# Patient Record
Sex: MALE | Race: ASIAN | NOT HISPANIC OR LATINO | URBAN - METROPOLITAN AREA
[De-identification: names, ages, dates, MRNs, and addresses within clinical notes are randomized per-mention and may not be internally consistent; named-entity substitution may affect disease eponyms.]

---

## 2017-01-16 ENCOUNTER — COMMUNICATION - HEALTHEAST (OUTPATIENT)
Dept: INTERNAL MEDICINE | Facility: CLINIC | Age: 82
End: 2017-01-16

## 2017-01-16 DIAGNOSIS — E11.9 DIABETES MELLITUS (H): ICD-10-CM

## 2017-01-16 DIAGNOSIS — E78.5 HYPERLIPIDEMIA: ICD-10-CM

## 2017-01-16 DIAGNOSIS — I10 HTN (HYPERTENSION): ICD-10-CM

## 2017-02-07 ENCOUNTER — COMMUNICATION - HEALTHEAST (OUTPATIENT)
Dept: INTERNAL MEDICINE | Facility: CLINIC | Age: 82
End: 2017-02-07

## 2017-02-08 ENCOUNTER — OFFICE VISIT - HEALTHEAST (OUTPATIENT)
Dept: INTERNAL MEDICINE | Facility: CLINIC | Age: 82
End: 2017-02-08

## 2017-02-08 DIAGNOSIS — I10 ESSENTIAL HYPERTENSION: ICD-10-CM

## 2017-02-08 DIAGNOSIS — E11.9 TYPE 2 DIABETES MELLITUS (H): ICD-10-CM

## 2017-02-23 ENCOUNTER — COMMUNICATION - HEALTHEAST (OUTPATIENT)
Dept: INTERNAL MEDICINE | Facility: CLINIC | Age: 82
End: 2017-02-23

## 2017-03-07 ENCOUNTER — RECORDS - HEALTHEAST (OUTPATIENT)
Dept: ADMINISTRATIVE | Facility: OTHER | Age: 82
End: 2017-03-07

## 2017-03-07 ENCOUNTER — COMMUNICATION - HEALTHEAST (OUTPATIENT)
Dept: INTERNAL MEDICINE | Facility: CLINIC | Age: 82
End: 2017-03-07

## 2017-03-07 ENCOUNTER — OFFICE VISIT - HEALTHEAST (OUTPATIENT)
Dept: ALLERGY | Facility: CLINIC | Age: 82
End: 2017-03-07

## 2017-03-07 ENCOUNTER — AMBULATORY - HEALTHEAST (OUTPATIENT)
Dept: INTERNAL MEDICINE | Facility: CLINIC | Age: 82
End: 2017-03-07

## 2017-03-07 DIAGNOSIS — J45.30 MILD PERSISTENT ASTHMA WITHOUT COMPLICATION: ICD-10-CM

## 2017-03-07 DIAGNOSIS — J30.89 ALLERGIC RHINITIS DUE TO OTHER ALLERGEN: ICD-10-CM

## 2017-03-07 DIAGNOSIS — E11.9 TYPE 2 DIABETES MELLITUS (H): ICD-10-CM

## 2017-03-07 RX ORDER — FAMOTIDINE 10 MG
10 TABLET ORAL PRN
Status: SHIPPED | COMMUNITY
Start: 2017-03-07

## 2017-03-07 ASSESSMENT — MIFFLIN-ST. JEOR: SCORE: 1265.52

## 2017-03-09 ENCOUNTER — COMMUNICATION - HEALTHEAST (OUTPATIENT)
Dept: INTERNAL MEDICINE | Facility: CLINIC | Age: 82
End: 2017-03-09

## 2017-03-09 ENCOUNTER — AMBULATORY - HEALTHEAST (OUTPATIENT)
Dept: INTERNAL MEDICINE | Facility: CLINIC | Age: 82
End: 2017-03-09

## 2017-03-09 DIAGNOSIS — E11.9 TYPE 2 DIABETES MELLITUS (H): ICD-10-CM

## 2017-03-09 DIAGNOSIS — E11.9 DM2 (DIABETES MELLITUS, TYPE 2) (H): ICD-10-CM

## 2017-03-09 DIAGNOSIS — E11.9 DIABETES MELLITUS (H): ICD-10-CM

## 2017-03-09 RX ORDER — BLOOD-GLUCOSE CONTROL, NORMAL
EACH MISCELLANEOUS
Qty: 1 EACH | Refills: 0 | Status: SHIPPED | OUTPATIENT
Start: 2017-03-09

## 2017-03-13 ENCOUNTER — COMMUNICATION - HEALTHEAST (OUTPATIENT)
Dept: ALLERGY | Facility: CLINIC | Age: 82
End: 2017-03-13

## 2017-03-14 ENCOUNTER — AMBULATORY - HEALTHEAST (OUTPATIENT)
Dept: ALLERGY | Facility: CLINIC | Age: 82
End: 2017-03-14

## 2017-03-14 DIAGNOSIS — J45.30 MILD PERSISTENT ASTHMA WITHOUT COMPLICATION: ICD-10-CM

## 2017-03-27 ENCOUNTER — COMMUNICATION - HEALTHEAST (OUTPATIENT)
Dept: INTERNAL MEDICINE | Facility: CLINIC | Age: 82
End: 2017-03-27

## 2017-03-27 DIAGNOSIS — N20.0 KIDNEY STONE: ICD-10-CM

## 2017-03-28 ENCOUNTER — COMMUNICATION - HEALTHEAST (OUTPATIENT)
Dept: UROLOGY | Facility: CLINIC | Age: 82
End: 2017-03-28

## 2017-04-12 ENCOUNTER — COMMUNICATION - HEALTHEAST (OUTPATIENT)
Dept: ALLERGY | Facility: CLINIC | Age: 82
End: 2017-04-12

## 2017-04-17 ENCOUNTER — OFFICE VISIT - HEALTHEAST (OUTPATIENT)
Dept: ALLERGY | Facility: CLINIC | Age: 82
End: 2017-04-17

## 2017-04-17 DIAGNOSIS — J30.89 ALLERGIC RHINITIS DUE TO OTHER ALLERGEN: ICD-10-CM

## 2017-04-17 DIAGNOSIS — J45.30 MILD PERSISTENT ASTHMA WITHOUT COMPLICATION: ICD-10-CM

## 2017-04-26 ENCOUNTER — RECORDS - HEALTHEAST (OUTPATIENT)
Dept: ADMINISTRATIVE | Facility: OTHER | Age: 82
End: 2017-04-26

## 2017-05-24 ENCOUNTER — RECORDS - HEALTHEAST (OUTPATIENT)
Dept: ADMINISTRATIVE | Facility: OTHER | Age: 82
End: 2017-05-24

## 2017-07-02 ENCOUNTER — RECORDS - HEALTHEAST (OUTPATIENT)
Dept: ADMINISTRATIVE | Facility: OTHER | Age: 82
End: 2017-07-02

## 2017-07-05 ENCOUNTER — RECORDS - HEALTHEAST (OUTPATIENT)
Dept: ADMINISTRATIVE | Facility: OTHER | Age: 82
End: 2017-07-05

## 2017-07-14 ENCOUNTER — COMMUNICATION - HEALTHEAST (OUTPATIENT)
Dept: ALLERGY | Facility: CLINIC | Age: 82
End: 2017-07-14

## 2017-07-14 DIAGNOSIS — J45.30 MILD PERSISTENT ASTHMA WITHOUT COMPLICATION: ICD-10-CM

## 2017-08-22 ENCOUNTER — COMMUNICATION - HEALTHEAST (OUTPATIENT)
Dept: ALLERGY | Facility: CLINIC | Age: 82
End: 2017-08-22

## 2017-08-22 DIAGNOSIS — J45.30 MILD PERSISTENT ASTHMA WITHOUT COMPLICATION: ICD-10-CM

## 2017-08-24 ENCOUNTER — OFFICE VISIT - HEALTHEAST (OUTPATIENT)
Dept: INTERNAL MEDICINE | Facility: CLINIC | Age: 82
End: 2017-08-24

## 2017-08-24 DIAGNOSIS — I10 ESSENTIAL HYPERTENSION: ICD-10-CM

## 2017-08-24 DIAGNOSIS — E11.9 TYPE 2 DIABETES MELLITUS (H): ICD-10-CM

## 2017-08-24 DIAGNOSIS — E78.2 MIXED HYPERLIPIDEMIA: ICD-10-CM

## 2017-08-24 LAB
CHOLEST SERPL-MCNC: 139 MG/DL
FASTING STATUS PATIENT QL REPORTED: YES
HBA1C MFR BLD: 6 % (ref 3.5–6)
HDLC SERPL-MCNC: 48 MG/DL
LDLC SERPL CALC-MCNC: 73 MG/DL
TRIGL SERPL-MCNC: 89 MG/DL

## 2017-08-31 ENCOUNTER — RECORDS - HEALTHEAST (OUTPATIENT)
Dept: ADMINISTRATIVE | Facility: OTHER | Age: 82
End: 2017-08-31

## 2017-09-20 ENCOUNTER — RECORDS - HEALTHEAST (OUTPATIENT)
Dept: ADMINISTRATIVE | Facility: OTHER | Age: 82
End: 2017-09-20

## 2017-10-06 ENCOUNTER — RECORDS - HEALTHEAST (OUTPATIENT)
Dept: ADMINISTRATIVE | Facility: OTHER | Age: 82
End: 2017-10-06

## 2017-11-01 ENCOUNTER — COMMUNICATION - HEALTHEAST (OUTPATIENT)
Dept: INTERNAL MEDICINE | Facility: CLINIC | Age: 82
End: 2017-11-01

## 2017-11-02 ENCOUNTER — COMMUNICATION - HEALTHEAST (OUTPATIENT)
Dept: INTERNAL MEDICINE | Facility: CLINIC | Age: 82
End: 2017-11-02

## 2017-11-02 DIAGNOSIS — E11.9 TYPE 2 DIABETES MELLITUS (H): ICD-10-CM

## 2018-02-22 ENCOUNTER — COMMUNICATION - HEALTHEAST (OUTPATIENT)
Dept: INTERNAL MEDICINE | Facility: CLINIC | Age: 83
End: 2018-02-22

## 2018-02-22 DIAGNOSIS — E11.9 DIABETES MELLITUS (H): ICD-10-CM

## 2018-02-22 DIAGNOSIS — E78.5 HYPERLIPIDEMIA: ICD-10-CM

## 2018-02-22 DIAGNOSIS — I10 HTN (HYPERTENSION): ICD-10-CM

## 2018-04-16 ENCOUNTER — COMMUNICATION - HEALTHEAST (OUTPATIENT)
Dept: INTERNAL MEDICINE | Facility: CLINIC | Age: 83
End: 2018-04-16

## 2018-04-24 ENCOUNTER — COMMUNICATION - HEALTHEAST (OUTPATIENT)
Dept: INTERNAL MEDICINE | Facility: CLINIC | Age: 83
End: 2018-04-24

## 2018-04-24 DIAGNOSIS — I10 ESSENTIAL HYPERTENSION: ICD-10-CM

## 2018-04-24 DIAGNOSIS — E78.2 MIXED HYPERLIPIDEMIA: ICD-10-CM

## 2018-04-24 DIAGNOSIS — E11.9 TYPE 2 DIABETES MELLITUS (H): ICD-10-CM

## 2018-04-25 ENCOUNTER — OFFICE VISIT - HEALTHEAST (OUTPATIENT)
Dept: INTERNAL MEDICINE | Facility: CLINIC | Age: 83
End: 2018-04-25

## 2018-04-25 DIAGNOSIS — I10 ESSENTIAL HYPERTENSION: ICD-10-CM

## 2018-04-25 DIAGNOSIS — D64.9 ANEMIA: ICD-10-CM

## 2018-04-25 DIAGNOSIS — E78.2 MIXED HYPERLIPIDEMIA: ICD-10-CM

## 2018-04-25 DIAGNOSIS — E11.9 TYPE 2 DIABETES MELLITUS (H): ICD-10-CM

## 2018-04-25 LAB
ALBUMIN SERPL-MCNC: 3.6 G/DL (ref 3.5–5)
ALBUMIN UR-MCNC: NEGATIVE MG/DL
ALP SERPL-CCNC: 56 U/L (ref 45–120)
ALT SERPL W P-5'-P-CCNC: 29 U/L (ref 0–45)
ANION GAP SERPL CALCULATED.3IONS-SCNC: 9 MMOL/L (ref 5–18)
APPEARANCE UR: CLEAR
AST SERPL W P-5'-P-CCNC: 31 U/L (ref 0–40)
BACTERIA #/AREA URNS HPF: ABNORMAL HPF
BASOPHILS # BLD AUTO: 0 THOU/UL (ref 0–0.2)
BASOPHILS NFR BLD AUTO: 1 % (ref 0–2)
BILIRUB SERPL-MCNC: 1.2 MG/DL (ref 0–1)
BILIRUB UR QL STRIP: NEGATIVE
BUN SERPL-MCNC: 13 MG/DL (ref 8–28)
CALCIUM SERPL-MCNC: 9.7 MG/DL (ref 8.5–10.5)
CHLORIDE BLD-SCNC: 105 MMOL/L (ref 98–107)
CHOLEST SERPL-MCNC: 142 MG/DL
CO2 SERPL-SCNC: 27 MMOL/L (ref 22–31)
COLOR UR AUTO: YELLOW
CREAT SERPL-MCNC: 0.92 MG/DL (ref 0.7–1.3)
CREAT UR-MCNC: 25.6 MG/DL
EOSINOPHIL # BLD AUTO: 0.3 THOU/UL (ref 0–0.4)
EOSINOPHIL NFR BLD AUTO: 5 % (ref 0–6)
ERYTHROCYTE [DISTWIDTH] IN BLOOD BY AUTOMATED COUNT: 13.2 % (ref 11–14.5)
ERYTHROCYTE [DISTWIDTH] IN BLOOD BY AUTOMATED COUNT: 13.3 % (ref 11–14.5)
FASTING STATUS PATIENT QL REPORTED: YES
GFR SERPL CREATININE-BSD FRML MDRD: >60 ML/MIN/1.73M2
GLUCOSE BLD-MCNC: 104 MG/DL (ref 70–125)
GLUCOSE UR STRIP-MCNC: NEGATIVE MG/DL
HBA1C MFR BLD: 6.3 % (ref 3.5–6)
HCT VFR BLD AUTO: 38.8 % (ref 40–54)
HCT VFR BLD AUTO: 38.9 % (ref 40–54)
HDLC SERPL-MCNC: 50 MG/DL
HGB BLD-MCNC: 13 G/DL (ref 14–18)
HGB BLD-MCNC: 13.1 G/DL (ref 14–18)
HGB UR QL STRIP: ABNORMAL
KETONES UR STRIP-MCNC: NEGATIVE MG/DL
LDLC SERPL CALC-MCNC: 72 MG/DL
LEUKOCYTE ESTERASE UR QL STRIP: NEGATIVE
LYMPHOCYTES # BLD AUTO: 1.6 THOU/UL (ref 0.8–4.4)
LYMPHOCYTES NFR BLD AUTO: 25 % (ref 20–40)
MCH RBC QN AUTO: 29.7 PG (ref 27–34)
MCH RBC QN AUTO: 29.8 PG (ref 27–34)
MCHC RBC AUTO-ENTMCNC: 33.6 G/DL (ref 32–36)
MCHC RBC AUTO-ENTMCNC: 33.7 G/DL (ref 32–36)
MCV RBC AUTO: 88 FL (ref 80–100)
MCV RBC AUTO: 89 FL (ref 80–100)
MICROALBUMIN UR-MCNC: <0.5 MG/DL (ref 0–1.99)
MICROALBUMIN/CREAT UR: NORMAL MG/G
MONOCYTES # BLD AUTO: 0.4 THOU/UL (ref 0–0.9)
MONOCYTES NFR BLD AUTO: 6 % (ref 2–10)
NEUTROPHILS # BLD AUTO: 4 THOU/UL (ref 2–7.7)
NEUTROPHILS NFR BLD AUTO: 64 % (ref 50–70)
NITRATE UR QL: NEGATIVE
PH UR STRIP: 7 [PH] (ref 5–8)
PLATELET # BLD AUTO: 189 THOU/UL (ref 140–440)
PLATELET # BLD AUTO: 200 THOU/UL (ref 140–440)
PMV BLD AUTO: 7.7 FL (ref 7–10)
PMV BLD AUTO: 8.3 FL (ref 7–10)
POTASSIUM BLD-SCNC: 4.4 MMOL/L (ref 3.5–5)
PROT SERPL-MCNC: 7.1 G/DL (ref 6–8)
RBC # BLD AUTO: 4.38 MILL/UL (ref 4.4–6.2)
RBC # BLD AUTO: 4.39 MILL/UL (ref 4.4–6.2)
RBC #/AREA URNS AUTO: ABNORMAL HPF
SODIUM SERPL-SCNC: 141 MMOL/L (ref 136–145)
SP GR UR STRIP: 1.01 (ref 1–1.03)
SQUAMOUS #/AREA URNS AUTO: ABNORMAL LPF
TRIGL SERPL-MCNC: 99 MG/DL
UROBILINOGEN UR STRIP-ACNC: ABNORMAL
WBC #/AREA URNS AUTO: ABNORMAL HPF
WBC: 6.2 THOU/UL (ref 4–11)
WBC: 6.3 THOU/UL (ref 4–11)

## 2018-04-25 RX ORDER — CETIRIZINE HYDROCHLORIDE 10 MG/1
10 TABLET ORAL DAILY
Status: SHIPPED | COMMUNITY
Start: 2018-04-25

## 2018-05-02 ENCOUNTER — COMMUNICATION - HEALTHEAST (OUTPATIENT)
Dept: INTERNAL MEDICINE | Facility: CLINIC | Age: 83
End: 2018-05-02

## 2018-05-02 DIAGNOSIS — J45.30 MILD PERSISTENT ASTHMA WITHOUT COMPLICATION: ICD-10-CM

## 2018-05-14 ENCOUNTER — COMMUNICATION - HEALTHEAST (OUTPATIENT)
Dept: ALLERGY | Facility: CLINIC | Age: 83
End: 2018-05-14

## 2018-05-14 DIAGNOSIS — J45.30 MILD PERSISTENT ASTHMA WITHOUT COMPLICATION: ICD-10-CM

## 2018-05-14 RX ORDER — DEXAMETHASONE 4 MG/1
TABLET ORAL
Qty: 1 INHALER | Refills: 0 | Status: SHIPPED | OUTPATIENT
Start: 2018-05-14

## 2018-05-16 ENCOUNTER — COMMUNICATION - HEALTHEAST (OUTPATIENT)
Dept: INTERNAL MEDICINE | Facility: CLINIC | Age: 83
End: 2018-05-16

## 2018-05-16 DIAGNOSIS — E11.9 DIABETES MELLITUS (H): ICD-10-CM

## 2018-08-01 ENCOUNTER — COMMUNICATION - HEALTHEAST (OUTPATIENT)
Dept: INTERNAL MEDICINE | Facility: CLINIC | Age: 83
End: 2018-08-01

## 2018-08-01 DIAGNOSIS — I10 HTN (HYPERTENSION): ICD-10-CM

## 2018-08-01 DIAGNOSIS — E78.5 HYPERLIPIDEMIA: ICD-10-CM

## 2018-08-01 RX ORDER — SIMVASTATIN 20 MG
TABLET ORAL
Qty: 90 TABLET | Refills: 1 | Status: SHIPPED | OUTPATIENT
Start: 2018-08-01

## 2018-08-01 RX ORDER — CARVEDILOL 25 MG/1
TABLET ORAL
Qty: 180 TABLET | Refills: 1 | Status: SHIPPED | OUTPATIENT
Start: 2018-08-01

## 2018-10-12 ENCOUNTER — RECORDS - HEALTHEAST (OUTPATIENT)
Dept: ADMINISTRATIVE | Facility: OTHER | Age: 83
End: 2018-10-12

## 2018-10-16 ENCOUNTER — OFFICE VISIT - HEALTHEAST (OUTPATIENT)
Dept: INTERNAL MEDICINE | Facility: CLINIC | Age: 83
End: 2018-10-16

## 2018-10-16 DIAGNOSIS — E78.2 MIXED HYPERLIPIDEMIA: ICD-10-CM

## 2018-10-16 DIAGNOSIS — E11.9 TYPE 2 DIABETES MELLITUS (H): ICD-10-CM

## 2018-10-16 DIAGNOSIS — D64.9 ANEMIA: ICD-10-CM

## 2018-10-16 DIAGNOSIS — I10 ESSENTIAL HYPERTENSION: ICD-10-CM

## 2018-10-16 LAB
ALBUMIN SERPL-MCNC: 3.6 G/DL (ref 3.5–5)
ALBUMIN UR-MCNC: NEGATIVE MG/DL
ALP SERPL-CCNC: 50 U/L (ref 45–120)
ALT SERPL W P-5'-P-CCNC: 14 U/L (ref 0–45)
ANION GAP SERPL CALCULATED.3IONS-SCNC: 8 MMOL/L (ref 5–18)
APPEARANCE UR: CLEAR
AST SERPL W P-5'-P-CCNC: 21 U/L (ref 0–40)
BACTERIA #/AREA URNS HPF: ABNORMAL HPF
BASOPHILS # BLD AUTO: 0 THOU/UL (ref 0–0.2)
BASOPHILS NFR BLD AUTO: 1 % (ref 0–2)
BILIRUB SERPL-MCNC: 0.9 MG/DL (ref 0–1)
BILIRUB UR QL STRIP: NEGATIVE
BUN SERPL-MCNC: 12 MG/DL (ref 8–28)
CALCIUM SERPL-MCNC: 9.5 MG/DL (ref 8.5–10.5)
CHLORIDE BLD-SCNC: 105 MMOL/L (ref 98–107)
CHOLEST SERPL-MCNC: 122 MG/DL
CO2 SERPL-SCNC: 27 MMOL/L (ref 22–31)
COLOR UR AUTO: YELLOW
CREAT SERPL-MCNC: 0.89 MG/DL (ref 0.7–1.3)
CREAT UR-MCNC: 32.4 MG/DL
EOSINOPHIL # BLD AUTO: 0.6 THOU/UL (ref 0–0.4)
EOSINOPHIL NFR BLD AUTO: 9 % (ref 0–6)
ERYTHROCYTE [DISTWIDTH] IN BLOOD BY AUTOMATED COUNT: 13.6 % (ref 11–14.5)
ERYTHROCYTE [DISTWIDTH] IN BLOOD BY AUTOMATED COUNT: 13.7 % (ref 11–14.5)
FASTING STATUS PATIENT QL REPORTED: YES
GFR SERPL CREATININE-BSD FRML MDRD: >60 ML/MIN/1.73M2
GLUCOSE BLD-MCNC: 111 MG/DL (ref 70–125)
GLUCOSE UR STRIP-MCNC: NEGATIVE MG/DL
HBA1C MFR BLD: 6.2 % (ref 3.5–6)
HCT VFR BLD AUTO: 37.2 % (ref 40–54)
HCT VFR BLD AUTO: 38 % (ref 40–54)
HDLC SERPL-MCNC: 44 MG/DL
HGB BLD-MCNC: 12.5 G/DL (ref 14–18)
HGB BLD-MCNC: 12.6 G/DL (ref 14–18)
HGB UR QL STRIP: ABNORMAL
KETONES UR STRIP-MCNC: NEGATIVE MG/DL
LDLC SERPL CALC-MCNC: 62 MG/DL
LEUKOCYTE ESTERASE UR QL STRIP: NEGATIVE
LYMPHOCYTES # BLD AUTO: 1.8 THOU/UL (ref 0.8–4.4)
LYMPHOCYTES NFR BLD AUTO: 28 % (ref 20–40)
MCH RBC QN AUTO: 29.3 PG (ref 27–34)
MCH RBC QN AUTO: 29.5 PG (ref 27–34)
MCHC RBC AUTO-ENTMCNC: 33.3 G/DL (ref 32–36)
MCHC RBC AUTO-ENTMCNC: 33.5 G/DL (ref 32–36)
MCV RBC AUTO: 88 FL (ref 80–100)
MCV RBC AUTO: 88 FL (ref 80–100)
MICROALBUMIN UR-MCNC: <0.5 MG/DL (ref 0–1.99)
MICROALBUMIN/CREAT UR: NORMAL MG/G
MONOCYTES # BLD AUTO: 0.5 THOU/UL (ref 0–0.9)
MONOCYTES NFR BLD AUTO: 7 % (ref 2–10)
NEUTROPHILS # BLD AUTO: 3.5 THOU/UL (ref 2–7.7)
NEUTROPHILS NFR BLD AUTO: 55 % (ref 50–70)
NITRATE UR QL: NEGATIVE
PH UR STRIP: 7 [PH] (ref 5–8)
PLATELET # BLD AUTO: 155 THOU/UL (ref 140–440)
PLATELET # BLD AUTO: 161 THOU/UL (ref 140–440)
PMV BLD AUTO: 7.8 FL (ref 7–10)
PMV BLD AUTO: 8.5 FL (ref 7–10)
POTASSIUM BLD-SCNC: 4.3 MMOL/L (ref 3.5–5)
PROT SERPL-MCNC: 6.7 G/DL (ref 6–8)
RBC # BLD AUTO: 4.22 MILL/UL (ref 4.4–6.2)
RBC # BLD AUTO: 4.31 MILL/UL (ref 4.4–6.2)
RBC #/AREA URNS AUTO: ABNORMAL HPF
SODIUM SERPL-SCNC: 140 MMOL/L (ref 136–145)
SP GR UR STRIP: 1.01 (ref 1–1.03)
SQUAMOUS #/AREA URNS AUTO: ABNORMAL LPF
TRIGL SERPL-MCNC: 79 MG/DL
UROBILINOGEN UR STRIP-ACNC: ABNORMAL
WBC #/AREA URNS AUTO: ABNORMAL HPF
WBC: 6.2 THOU/UL (ref 4–11)
WBC: 6.4 THOU/UL (ref 4–11)

## 2018-10-16 ASSESSMENT — MIFFLIN-ST. JEOR: SCORE: 1284.57

## 2018-11-07 ENCOUNTER — COMMUNICATION - HEALTHEAST (OUTPATIENT)
Dept: INTERNAL MEDICINE | Facility: CLINIC | Age: 83
End: 2018-11-07

## 2018-11-07 DIAGNOSIS — E11.9 TYPE 2 DIABETES MELLITUS (H): ICD-10-CM

## 2018-11-23 ENCOUNTER — COMMUNICATION - HEALTHEAST (OUTPATIENT)
Dept: INTERNAL MEDICINE | Facility: CLINIC | Age: 83
End: 2018-11-23

## 2018-11-23 DIAGNOSIS — E11.9 DIABETES MELLITUS (H): ICD-10-CM

## 2018-11-26 ENCOUNTER — COMMUNICATION - HEALTHEAST (OUTPATIENT)
Dept: INTERNAL MEDICINE | Facility: CLINIC | Age: 83
End: 2018-11-26

## 2019-02-05 ENCOUNTER — COMMUNICATION - HEALTHEAST (OUTPATIENT)
Dept: INTERNAL MEDICINE | Facility: CLINIC | Age: 84
End: 2019-02-05

## 2019-02-05 DIAGNOSIS — I10 HTN (HYPERTENSION): ICD-10-CM

## 2019-02-07 RX ORDER — IRBESARTAN 300 MG/1
TABLET ORAL
Qty: 90 TABLET | Refills: 2 | Status: SHIPPED | OUTPATIENT
Start: 2019-02-07

## 2019-02-13 ENCOUNTER — RECORDS - HEALTHEAST (OUTPATIENT)
Dept: ADMINISTRATIVE | Facility: OTHER | Age: 84
End: 2019-02-13

## 2019-02-14 ENCOUNTER — RECORDS - HEALTHEAST (OUTPATIENT)
Dept: ADMINISTRATIVE | Facility: OTHER | Age: 84
End: 2019-02-14

## 2019-03-22 ENCOUNTER — COMMUNICATION - HEALTHEAST (OUTPATIENT)
Dept: INTERNAL MEDICINE | Facility: CLINIC | Age: 84
End: 2019-03-22

## 2019-07-31 ENCOUNTER — COMMUNICATION - HEALTHEAST (OUTPATIENT)
Dept: INTERNAL MEDICINE | Facility: CLINIC | Age: 84
End: 2019-07-31

## 2019-07-31 DIAGNOSIS — J45.30 MILD PERSISTENT ASTHMA WITHOUT COMPLICATION: ICD-10-CM

## 2019-08-01 RX ORDER — DEXAMETHASONE 4 MG/1
TABLET ORAL
Qty: 36 INHALER | Refills: 0 | Status: SHIPPED | OUTPATIENT
Start: 2019-08-01

## 2019-12-11 ENCOUNTER — COMMUNICATION - HEALTHEAST (OUTPATIENT)
Dept: INTERNAL MEDICINE | Facility: CLINIC | Age: 84
End: 2019-12-11

## 2019-12-11 DIAGNOSIS — E11.9 TYPE 2 DIABETES MELLITUS (H): ICD-10-CM

## 2019-12-15 ENCOUNTER — COMMUNICATION - HEALTHEAST (OUTPATIENT)
Dept: INTERNAL MEDICINE | Facility: CLINIC | Age: 84
End: 2019-12-15

## 2019-12-15 DIAGNOSIS — E11.9 TYPE 2 DIABETES MELLITUS (H): ICD-10-CM

## 2021-05-30 VITALS — WEIGHT: 146.38 LBS | BODY MASS INDEX: 24.36 KG/M2

## 2021-05-30 VITALS — WEIGHT: 143 LBS | HEIGHT: 65 IN | BODY MASS INDEX: 23.82 KG/M2

## 2021-05-31 NOTE — TELEPHONE ENCOUNTER
RN cannot approve Refill Request    Former patient of [facility] & has not established care with another provider.  Please assign refill request to covering provider per Clinic standard process.    RN can NOT refill this medication med is not covered by policy/route to provider. Last office visit: 10/16/2018 Lexy Sánchez MD Last Physical: 11/8/2016 Last MTM visit: Visit date not found Last visit same specialty: 10/16/2018 Lexy Sánchez MD.  Next visit within 3 mo: Visit date not found  Next physical within 3 mo: Visit date not found      Lilli Rao, [facility] Triage/Med Refill 7/31/2019    Requested Prescriptions   Pending Prescriptions Disp Refills     FLOVENT  mcg/actuation inhaler [Pharmacy Med Name: FLOVENT  MCG INHALER] 36 Inhaler 4     Sig: TAKE 2 PUFFS BY MOUTH TWICE A DAY       There is no refill protocol information for this order

## 2021-06-01 VITALS — WEIGHT: 145.44 LBS | BODY MASS INDEX: 24.2 KG/M2

## 2021-06-02 VITALS — WEIGHT: 147.2 LBS | BODY MASS INDEX: 24.53 KG/M2 | HEIGHT: 65 IN

## 2021-06-04 NOTE — TELEPHONE ENCOUNTER
Patient has no pcp.  Has not been seen in 14 months.  Has diabetes.  Refill denied, needs to be seen.    Courtney Low RN Triage and refills

## 2021-06-08 NOTE — PROGRESS NOTES
ASSESSMENT and PLAN:  1. Essential hypertension  I suspect, given the prior stability that this is related to this acute illness which is resolving.  He's w/o sx related to the BP.  He'll continue to monitor.  If it remains elevated, then I'll add chlorthalidone.      2. Type 2 diabetes mellitus  Stable    Patient Instructions   We'll keep your meds the same for now.  We'll give this a few weeks.  If you're getting readings in the 200s systolic or start to have symptoms related to your blood pressure, then I'd add another medication.          CHIEF COMPLAINT:  Chief Complaint   Patient presents with     Hypertension       HISTORY OF PRESENT ILLNESS:  Adrianna Pedersen is a 81 y.o. male  presenting to the clinic today for hypertension.     Hypertension: This winter he has been suffering from a cough and cold symptoms for which he took Xopenex. Ever since taking the Xopenex his blood pressure has been elevated. At one point his systolic blood pressure was near 210. Yesterday his blood pressure was 148/70 which has been the lowest reading he has been able to get. His blood pressure will frequently be near 198/70. Whenever he measures his blood pressure he uses his left arm. He is planning on buying a new blood pressure cuff because his is 15 years old and possibly providing inaccurate readings.     REVIEW OF SYSTEMS:   He denies chest pain and shortness of breath. He is experiencing some anxiety, but attributes that to this being his first winter here. He has joint pain in his right foot that he thinks is due to having flat feet that are agitated by his orthotics. He no longer has a cough. All other systems are negative.    TOBACCO USE:  History   Smoking Status     Never Smoker   Smokeless Tobacco     Not on file       VITALS:  Vitals:    02/08/17 0952   BP: 160/74   Patient Site: Right Arm   Pulse: 68   Weight: 146 lb 6 oz (66.4 kg)     Wt Readings from Last 3 Encounters:   02/08/17 146 lb 6 oz (66.4 kg)   11/08/16  143 lb (64.9 kg)       PHYSICAL EXAM:  Constitutional:  Reveals an alert, pleasant elderly male.   Vitals:  Noted.   Head: Normocephalic, without obvious abnormality, atraumatic   Lungs: Clear to auscultation bilaterally, respirations unlabored.   Heart: Regular rate and rhythm, S1 and S2 normal, no murmur, rub, or gallop,   Extremities: Extremities normal, atraumatic, no cyanosis or edema   Neurologic: Normal    ADDITIONAL HISTORY SUMMARIZED (2): None.  DECISION TO OBTAIN EXTRA INFORMATION (1): None.   RADIOLOGY TESTS (1): None.  LABS (1): None.  MEDICINE TESTS (1): None.  INDEPENDENT REVIEW (2 each): None.     The visit lasted a total of 8 minutes face to face with the patient. Over 50% of the time was spent counseling and educating the patient about hypertension.    I, Angelique Amador, am scribing for and in the presence of, Dr. Lexy Sánchez.    I, Dr. Lexy Sánchez, personally performed the services described in this documentation, as scribed by Angelique Amador in my presence, and it is both accurate and complete.    MEDICATIONS:  Current Outpatient Prescriptions   Medication Sig Dispense Refill     aspirin 81 MG EC tablet Take 81 mg by mouth daily.       blood glucose test strips Use 1 each As Directed daily. Dispense brand per patient's insurance at pharmacy discretion. 100 each 3     carvedilol (COREG) 25 MG tablet Take 1 tablet (25 mg total) by mouth 2 (two) times a day with meals. 180 tablet 3     cholecalciferol, vitamin D3, 400 unit Tab Take 2,000 Units by mouth once.       FLUZONE HIGH-DOSE 2016-17, PF, 180 mcg/0.5 mL Syrg injection ADM 0.5ML IM UTD  0     generic lancets Use lancet for once daily blood check related to DM E11.9.  Please dispense One Touch Ultra Soft brand. 100 each 3     irbesartan (AVAPRO) 300 MG tablet Take 1 tablet (300 mg total) by mouth daily. 90 tablet 3     metFORMIN (GLUCOPHAGE) 850 MG tablet Take 1 tablet (850 mg total) by mouth 2 (two) times a day with meals. 180 tablet 3      multivitamin with minerals (THERA-M) 9 mg iron-400 mcg Tab tablet Take 1 tablet by mouth daily.       OMEGA-3/DHA/EPA/FISH OIL (FISH OIL-OMEGA-3 FATTY ACIDS) 300-1,000 mg capsule Take 3 g by mouth daily.       psyllium with dextrose (METAMUCIL JAR) powder Take by mouth daily.       simvastatin (ZOCOR) 20 MG tablet Take 1 tablet (20 mg total) by mouth bedtime. 90 tablet 3     levalbuterol (XOPENEX HFA) 45 mcg/actuation inhaler Inhale 1-2 puffs every 4 (four) hours as needed for wheezing.       No current facility-administered medications for this visit.        Total data points: 0

## 2021-06-09 NOTE — PROGRESS NOTES
Chief complaint: Stuffy nose, cough and bronchitis    History of present illness: This is a pleasant 81-year-old gentleman who is here today for evaluation of congestion and cough.  He states about 10 years ago he was diagnosed with allergic asthma.  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  He was given Singulair to take for 30 days.  He is not sure if it helped.  He was prescribed Xopenex inhaler as well.  He reports he moved to [place] last April.  He moved to a home when they were doing a lot of construction in his neighborhood.  He states it is a new home.  When he moved here, he developed more congestion and a cough.  He's been needing his Xopenex inhaler 1-2 times per week.  He reports that the cough will wake him up from sleep around 4 or 5 in the morning.  Occasionally the cough is productive of clear mucus.  He does not cough to the point of throwing up.  No wheezing or shortness of breath.  He has never been on a daily inhaler.  He does take Zyrtec as needed which he believes does help.  He does use nasal rinses regularly.  He is not on any nasal sprays.  He has never been allergy tested.  Reports he did have repeat test done years ago and there was an acute bronchodilator response.    Past medical history: Diabetes, hypertension, hyperlipidemia, partial nephrectomy    Social history: He is a retired medical oncologist, lives in a new home without mold or water damage, no pets, carpeting, central air, former smoker    Family history: Father with asthma and son with allergies    Review of Systems performed as above and the remainder is negative.        Current Outpatient Prescriptions:      aspirin 81 MG EC tablet, Take 81 mg by mouth daily., Disp: , Rfl:      blood glucose test strips, Use 1 each As Directed daily. Dispense brand per patient's insurance at pharmacy discretion., Disp: 100 each, Rfl: 3     carvedilol (COREG) 25 MG tablet, Take 1 tablet (25 mg total) by mouth 2  "(two) times a day with meals., Disp: 180 tablet, Rfl: 3     cholecalciferol, vitamin D3, 400 unit Tab, Take 2,000 Units by mouth once., Disp: , Rfl:      famotidine (FOR PEPCID) 10 MG tablet, Take 10 mg by mouth as needed for heartburn., Disp: , Rfl:      generic lancets, Use lancet for once daily blood check related to DM E11.9.  Please dispense One Touch Ultra Soft brand., Disp: 100 each, Rfl: 3     irbesartan (AVAPRO) 300 MG tablet, Take 1 tablet (300 mg total) by mouth daily., Disp: 90 tablet, Rfl: 3     levalbuterol (XOPENEX HFA) 45 mcg/actuation inhaler, Inhale 1-2 puffs every 4 (four) hours as needed for wheezing., Disp: , Rfl:      metFORMIN (GLUCOPHAGE) 850 MG tablet, Take 1 tablet (850 mg total) by mouth 2 (two) times a day with meals., Disp: 180 tablet, Rfl: 3     multivitamin with minerals (THERA-M) 9 mg iron-400 mcg Tab tablet, Take 1 tablet by mouth daily., Disp: , Rfl:      OMEGA-3/DHA/EPA/FISH OIL (FISH OIL-OMEGA-3 FATTY ACIDS) 300-1,000 mg capsule, Take 3 g by mouth daily., Disp: , Rfl:      psyllium with dextrose (METAMUCIL JAR) powder, Take by mouth daily., Disp: , Rfl:      simvastatin (ZOCOR) 20 MG tablet, Take 1 tablet (20 mg total) by mouth bedtime., Disp: 90 tablet, Rfl: 3     fluticasone (FLOVENT HFA) 110 mcg/actuation inhaler, Inhale 2 puffs 2 (two) times a day., Disp: 1 Inhaler, Rfl: 1     FLUZONE HIGH-DOSE 2016-17, PF, 180 mcg/0.5 mL Syrg injection, ADM 0.5ML IM UTD, Disp: , Rfl: 0    No Known Allergies    Visit Vitals     /76     Pulse 68     Ht 5' 5\" (1.651 m)     Wt 143 lb (64.9 kg)     BMI 23.8 kg/m2     Gen:  Pleasant male not in acute distress  HEENT:  Eyes no erythema of the bulbar or palpebral conjunctiva, no edema.  Ears:  TMs well visualized, no effusions.  Nose:  Clear mucous drainage, mucosa normal.  Mouth:  Throat drainage, no lip or tongue edema.    Cardiac:  Regular rate and rhythm, no murmurs, rubs or gallops  Respiratory:  Clear to auscultation bilaterally, no " adventitious breath sounds  Lymph:  No supraclavicular or cervical lymphadenopathy  Skin:  No rashes or lesions  Psych:  Alert and oriented times 3    FENO:  60    Last Percutaneous Allergy Test Results  Trees  Charles, White  1:20 H  (W/F in mm): 0-0 (03/07/17 1115)  Birch Mix 1:20 H (W/F in mm): 0-0 (03/07/17 1115)  Belle Rose, Common 1:20 H (W/F in mm): 0-0 (03/07/17 1115)  Elm, American 1:20 H (W/F in mm): 0-0 (03/07/17 1115)  [place], Shagbark 1:20 H (W/F in mm): 0-0 (03/07/17 1115)  Maple, Hard/Sugar 1:20 H (W/F in mm): 0-0 (03/07/17 1115)  [place] Mix 1:20 H (W/F in mm): 0-0 (03/07/17 1115)  Oak, Red 1:20 H (W/F in mm): 0-0 (03/07/17 1115)  Hawthorn, American 1:20 H (W/F in mm): 0-0 (03/07/17 1115)  [place] Tree 1:20 H (W/F in mm): 0-0 (03/07/17 1115)  Dust Mites  D. Pteronyssinus Mite 30,000 AU/ML H (W/F in mm): 4-f (03/07/17 1115)  D. Farinae Mite 30,000 AU/ML H (W/F in mm: 3-f (03/07/17 1115)  Grasses  Grass Mix #4 10,000 BAU/ML H: 0-0 (03/07/17 1115)  Gonzalo Grass 1:20 H (W/F in mm): 0-0 (03/07/17 1115)  Cockroach  Cockroach Mix 1:10 H (W/F in mm): 0-0 (03/07/17 1115)  Molds/Fungi  Alternaria Tenuis 1:10 H (W/F in mm): 5-10 (03/07/17 1115)  Aspergillus Fumigatus 1:10 H (W/F in mm): 0-0 (03/07/17 1115)  Homodendrum Cladosporioides 1:10 H (W/F in mm): 0-0 (03/07/17 1115)  Penicillin Notatum 1:10 H (W/F in mm): 4-f (03/07/17 1115)  Epicoccum 1:10 H (W/F in mm): 3-f (03/07/17 1115)  Weeds  Ragweed, Short 1:20 H (W/F in mm): 0-0 (03/07/17 1115)  Dock, Sorrel 1:20 H (W/F in mm): 0-0 (03/07/17 1115)  Lamb's Quarter 1:20 H (W/F in mm): 0-0 (03/07/17 1115)  Pigweed, Rough Red Root 1:20 H  (W/F in mm): 0-0 (03/07/17 1115)  Plantain, English 1:20 H  (W/F in mm): 0-0 (03/07/17 1115)  Sagebrush, Mugwort 1:20 H  (W/F in mm): 0-0 (03/07/17 1115)  Animal  Cat 10,000 BAU/ML H (W/F in mm): 0-0 (03/07/17 1115)  Dog 1:10 H (W/F in mm): 0-0 (03/07/17 1115)  Controls  Device Type: QUINTIP (03/07/17 1115)  Neg. control: 50%  Glycerine/Saline H (W/F in mm): 0-0 (03/07/17 1115)  Pos. control: Histamine 6mg/ML (W/F in mms): 5-15 (03/07/17 1115)    Spirometry was performed.  FEV1 to FVC ratio 69%.  FEV1 1.43 L or 75% of predicted.  FVC 2.09 L or 76% of predicted.    Impression report and plan:  1.  Mild persistent asthma  2.  Allergic rhinitis    I went over environmental control.  I recommended he continue his Zyrtec.  Continue nasal rinses.  Add Flovent 110  g 2 puffs twice daily.  Follow in 1 month for repeat breathing tests.  Consider the addition of fluticasone nasal spray to help with drainage.

## 2021-06-10 NOTE — PROGRESS NOTES
Chief complaint:  Asthma follow-up    History of Present Illness:  This is a pleasant 81 year old gentleman here today for follow-up of asthma.  I saw him one month ago and switched him from Pulmicort to Flovent.  He was having difficulty using Pulmicort due to his arthritis.  He was also noting some blood sugar elevations with Pulmicort.  Since I last saw him, he had a kidney stone that passed.  He is noted a little bit of blood sugar elevations with Flovent but notes that he is able to use it a lot easier.  He has had no cough, wheeze or shortness of breath no need for his albuterol inhaler.  He continues on Flonase nasal spray.    Past medical history, social history, family medical history, meds and allergies reviewed and updated accordingly.    Review of Systems performed as above and the remainder is negative.      Current Outpatient Prescriptions:      aspirin 81 MG EC tablet, Take 81 mg by mouth daily., Disp: , Rfl:      blood glucose control, normal Soln, Use to test blood sugar 2x day. DX:E11.9, Disp: 1 each, Rfl: 0     blood glucose test strips, Use 1 each As Directed daily. Dispense brand per patient's insurance at pharmacy discretion., Disp: 100 each, Rfl: 3     blood-glucose meter Misc, Use 1 Device As Directed 2 (two) times a day., Disp: 1 each, Rfl: 0     budesonide (PULMICORT FLEXHALER) 90 mcg/actuation inhaler, Inhale 1 puff 2 (two) times a day., Disp: 1 Inhaler, Rfl: 1     carvedilol (COREG) 25 MG tablet, Take 1 tablet (25 mg total) by mouth 2 (two) times a day with meals., Disp: 180 tablet, Rfl: 3     cholecalciferol, vitamin D3, 400 unit Tab, Take 2,000 Units by mouth once., Disp: , Rfl:      famotidine (FOR PEPCID) 10 MG tablet, Take 10 mg by mouth as needed for heartburn., Disp: , Rfl:      fluticasone (FLOVENT HFA) 110 mcg/actuation inhaler, Inhale 2 puffs 2 (two) times a day., Disp: 1 Inhaler, Rfl: 5     FLUZONE HIGH-DOSE 2016-17, PF, 180 mcg/0.5 mL Syrg injection, ADM 0.5ML IM UTD, Disp: ,  Rfl: 0     generic lancets, Use lancet for once daily blood check related to DM E11.9.  Please dispense One Touch Ultra Soft brand., Disp: 100 each, Rfl: 3     irbesartan (AVAPRO) 300 MG tablet, Take 1 tablet (300 mg total) by mouth daily., Disp: 90 tablet, Rfl: 3     levalbuterol (XOPENEX HFA) 45 mcg/actuation inhaler, Inhale 1-2 puffs every 4 (four) hours as needed for wheezing., Disp: , Rfl:      metFORMIN (GLUCOPHAGE) 850 MG tablet, Take 1 tablet (850 mg total) by mouth 2 (two) times a day with meals., Disp: 180 tablet, Rfl: 3     multivitamin with minerals (THERA-M) 9 mg iron-400 mcg Tab tablet, Take 1 tablet by mouth daily., Disp: , Rfl:      OMEGA-3/DHA/EPA/FISH OIL (FISH OIL-OMEGA-3 FATTY ACIDS) 300-1,000 mg capsule, Take 3 g by mouth daily., Disp: , Rfl:      psyllium with dextrose (METAMUCIL JAR) powder, Take by mouth daily., Disp: , Rfl:      simvastatin (ZOCOR) 20 MG tablet, Take 1 tablet (20 mg total) by mouth bedtime., Disp: 90 tablet, Rfl: 3    No Known Allergies    /74  Pulse 70  SpO2 97%  Gen: Pleasant male not in acute distress  HEENT: Eyes no erythema of the bulbar or palpebral conjunctiva, no edema.  Mouth: Throat clear, no lip or tongue edema.   Cardiac: Regular rate and rhythm, no murmurs, rubs or gallops  Respiratory: Clear to auscultation bilaterally, no adventitious breath sounds    Skin: No rashes or lesions  Psych: Alert and oriented times 3    FENO: 54    Impression report and plan:    1.  Mild persistent asthma  2.  Allergic rhinitis    Continue Flovent 110 mg 2 puffs twice daily.  Asthma action plan provided.  Follow in 3-6 months with repeat breathing test at that time.  Notify breakthrough symptoms.

## 2021-06-12 NOTE — PROGRESS NOTES
ASSESSMENT and PLAN:  1. Essential hypertension  Stable and well controlled; labs today.  Plan 6 month f/u.  - Urinalysis-UC if Indicated  - Comprehensive Metabolic Panel    2. Mixed hyperlipidemia  Stable, labs today  - Comprehensive Metabolic Panel  - Thyroid Cascade  - Lipid Cascade    3. Type 2 diabetes mellitus  Well controlled and managed.  Labs today.  - Microalbumin, Random Urine  - Glycosylated Hemoglobin A1c  - HM2(CBC w/o Differential)        Medications Discontinued During This Encounter   Medication Reason     fluticasone (FLOVENT HFA) 110 mcg/actuation inhaler Duplicate order       No Follow-up on file.    Patient Instructions   Every looks fine today.  I'm happy that things are going so well for you.      Please plan on a flu vaccine in the fall.    We can do a six month follow up, earlier as needed.    Today's labs will be available on [employer] ([employer]).  If you aren't signed up, then we'll mail them out.  If anything needs more immediate follow up, we'll also call.    Take care!      CHIEF COMPLAINT:  Chief Complaint   Patient presents with     Follow-up     blood work     Asthma     dx with mild asthma with Dr. Chapin       HISTORY OF PRESENT ILLNESS:  Adrianna Pedersen is a 82 y.o. male  presenting to the clinic today for f/u of his DM2, HTN and Hyperlipidemia.  He was also dx'd w/ mild asthma and is seeing Dr. Chapin for management of that.    For his DM2, he's on metformin 850mg bid.  He's on a daily asa and statin.  He checks his blood sugars about every 3rd time, three times in the day.  His numbers are in a good range; he brought a log w/ him that we can scan in.  No hypos.  He had some numbers in the low 200s w/ his first steroid inhaler, but that has resolved.    For his BP, he takes carvedilol 25mg daily and losartan 300mg daily.  His BP is well controlled.    He is on simvastatin 20mg for his cholesterol.  He is fasting today to recheck.      REVIEW OF SYSTEMS:   : he was treated for a kidney stone;  resolved  MSK: he's seeing ortho for this left ankle and that is improving   All other systems are negative.    PFSH:  He's       TOBACCO USE:  History   Smoking Status     Never Smoker   Smokeless Tobacco     Not on file       VITALS:  Vitals:    08/24/17 0847   BP: 134/78   Patient Site: Right Arm   Patient Position: Sitting   Cuff Size: Adult Regular   Pulse: 64     Wt Readings from Last 3 Encounters:   03/07/17 143 lb (64.9 kg)   02/08/17 146 lb 6 oz (66.4 kg)   11/08/16 143 lb (64.9 kg)         PHYSICAL EXAM:  Constitutional:  Reveals an alert, pleasant adult male.   Vitals:  Noted.   Lungs: Clear to auscultation bilaterally, respirations unlabored.   Heart: Regular rate and rhythm, S1 and S2 normal, no murmur, rub, or gallop,   Abdomen: Soft, non-tender, bowel sounds active, no masses, no organomegaly   Extremities: Extremities normal, atraumatic, no cyanosis or edema   Skin: Skin color, texture, turgor normal, no rashes or lesions on exposed areas  Neurologic: grossly normal     MEDICATIONS:  Current Outpatient Prescriptions   Medication Sig Dispense Refill     aspirin 81 MG EC tablet Take 81 mg by mouth daily.       blood glucose control, normal Soln Use to test blood sugar 2x day. DX:E11.9 1 each 0     blood glucose test strips Use 1 each As Directed daily. Dispense brand per patient's insurance at pharmacy discretion. 100 each 3     blood-glucose meter Misc Use 1 Device As Directed 2 (two) times a day. 1 each 0     carvedilol (COREG) 25 MG tablet Take 1 tablet (25 mg total) by mouth 2 (two) times a day with meals. 180 tablet 3     cholecalciferol, vitamin D3, 400 unit Tab Take 2,000 Units by mouth once.       famotidine (FOR PEPCID) 10 MG tablet Take 10 mg by mouth as needed for heartburn.       fluticasone (FLOVENT HFA) 110 mcg/actuation inhaler Inhale 2 puffs 2 (two) times a day. 1 Inhaler 3     FLUZONE HIGH-DOSE 2016-17, PF, 180 mcg/0.5 mL Syrg injection ADM 0.5ML IM UTD  0     generic lancets  Use lancet for once daily blood check related to DM E11.9.  Please dispense One Touch Ultra Soft brand. 100 each 3     irbesartan (AVAPRO) 300 MG tablet Take 1 tablet (300 mg total) by mouth daily. 90 tablet 3     levalbuterol (XOPENEX HFA) 45 mcg/actuation inhaler Inhale 1-2 puffs every 4 (four) hours as needed for wheezing.       metFORMIN (GLUCOPHAGE) 850 MG tablet Take 1 tablet (850 mg total) by mouth 2 (two) times a day with meals. 180 tablet 3     multivitamin with minerals (THERA-M) 9 mg iron-400 mcg Tab tablet Take 1 tablet by mouth daily.       OMEGA-3/DHA/EPA/FISH OIL (FISH OIL-OMEGA-3 FATTY ACIDS) 300-1,000 mg capsule Take 300 g by mouth daily.        psyllium with dextrose (METAMUCIL JAR) powder Take by mouth daily.       simvastatin (ZOCOR) 20 MG tablet Take 1 tablet (20 mg total) by mouth bedtime. 90 tablet 3     budesonide (PULMICORT FLEXHALER) 90 mcg/actuation inhaler Inhale 1 puff 2 (two) times a day. 1 Inhaler 1     No current facility-administered medications for this visit.

## 2021-06-15 PROBLEM — E78.2 MIXED HYPERLIPIDEMIA: Status: ACTIVE | Noted: 2017-02-08

## 2021-06-15 PROBLEM — I10 ESSENTIAL HYPERTENSION: Status: ACTIVE | Noted: 2017-02-08

## 2021-06-15 PROBLEM — E11.9 TYPE 2 DIABETES MELLITUS (H): Status: ACTIVE | Noted: 2017-02-08

## 2021-06-17 NOTE — PROGRESS NOTES
ASSESSMENT and PLAN:  1. Essential hypertension  Stable and well controlled.    - Comprehensive Metabolic Panel  - Urinalysis-UC if Indicated    2. Type 2 diabetes mellitus  His A1c returned during our visit.  It's up slightly from last check but we both agree it's still very reasonable and we don't want to push tighter control.  - Comprehensive Metabolic Panel  - Glycosylated Hemoglobin A1c  - HM2(CBC w/o Differential)  - Microalbumin, Random Urine    3. Mixed hyperlipidemia  He's been stable on 20 mg of simvastatin.  He had fasting labs done prior to our visit.    - Comprehensive Metabolic Panel  - Lipid Cascade    4. Anemia  This is mild and stable.  He requests cbc w/ diff as he saw several pts during his career his age who developed mylodysplastic disease.  - HM1(CBC and Differential)  - HM1 (CBC with Diff)        There are no discontinued medications.    Return in about 6 months (around 10/25/2018).    Patient Instructions   Today's labs will be available on [employer].  If you aren't signed up, then we'll mail them out.  If anything needs more immediate follow up, we'll also call.    Take care!      CHIEF COMPLAINT:  Chief Complaint   Patient presents with     Follow-up     DM, HTN       HISTORY OF PRESENT ILLNESS:  Adrianna Pedersen is a 82 y.o. male  presenting to the clinic today for follow up of his DM2 and HTN.  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  His asthma is very well controlled.  He'd like to see me now for medication refills given his stability.    He brought in a log of his blood sugars.  It's a very detailed record.  They look reasonable on the whole.  He denies any hypoglycemia.  He's on a daily asa.  He takes metformin 850 mg bid w/ meals.  He's on 20 mg daily of simvastatin.  He has a callous on the bottom of his foot, but he takes care of it at home and it's not currently bothering him.      His HTN has been well  controlled on irbesartan 300 mg daily and carvedilol 25 mg bid.      REVIEW OF SYSTEMS:    All other systems are negative.    PFSH:  Family History   Problem Relation Age of Onset     Nephrolithiasis Daughter      Hyperlipidemia Son      Pneumonia Father      Parkinsonism Brother      Diabetes Brother      Kidney failure Brother      Diabetes Brother      TOBACCO USE:  History   Smoking Status     Never Smoker   Smokeless Tobacco     Never Used       VITALS:  Vitals:    04/25/18 1000   BP: 128/64   Patient Site: Right Arm   Pulse: 60   Weight: 145 lb 7 oz (66 kg)     Wt Readings from Last 3 Encounters:   04/25/18 145 lb 7 oz (66 kg)   03/07/17 143 lb (64.9 kg)   02/08/17 146 lb 6 oz (66.4 kg)         PHYSICAL EXAM:  Constitutional:  Reveals an alert, pleasant adult male.   Vitals:  Noted.   Lungs: Clear to auscultation bilaterally, respirations unlabored.   Heart: Regular rate and rhythm, S1 and S2 normal, no murmur, rub, or gallop,   Abdomen: Soft, non-tender, bowel sounds active all four quadrants, no masses, no organomegaly   Extremities: Extremities normal, atraumatic, no cyanosis or edema, no open lesions on his feet bilaterally       MEDICATIONS:  Current Outpatient Prescriptions   Medication Sig Dispense Refill     aspirin 81 MG EC tablet Take 81 mg by mouth daily.       blood glucose control, normal Soln Use to test blood sugar 2x day. DX:E11.9 1 each 0     blood glucose test strips Use 1 each As Directed daily. Dispense brand per patient's insurance at pharmacy discretion.   Dx. E11.9 100 strip 11     blood-glucose meter Misc Use 1 Device As Directed 2 (two) times a day. 1 each 0     carvedilol (COREG) 25 MG tablet Take 1 tablet (25 mg total) by mouth 2 (two) times a day with meals. 180 tablet 1     cetirizine (ZYRTEC) 10 MG tablet Take 10 mg by mouth daily.       cholecalciferol, vitamin D3, 400 unit Tab Take 2,000 Units by mouth once.       famotidine (FOR PEPCID) 10 MG tablet Take 10 mg by mouth as  needed for heartburn.       fluticasone (FLOVENT HFA) 110 mcg/actuation inhaler Inhale 2 puffs 2 (two) times a day. 1 Inhaler 3     FLUZONE HIGH-DOSE 2016-17, PF, 180 mcg/0.5 mL Syrg injection ADM 0.5ML IM UTD  0     FLUZONE HIGH-DOSE 2017-18, PF, 180 mcg/0.5 mL Syrg injection ADM 0.5ML IM UTD  0     generic lancets Use lancet for once daily blood check related to DM E11.9.  Please dispense One Touch Ultra Soft brand. 100 each 3     irbesartan (AVAPRO) 300 MG tablet Take 1 tablet (300 mg total) by mouth daily. 90 tablet 1     metFORMIN (GLUCOPHAGE) 850 MG tablet Take 1 tablet (850 mg total) by mouth 2 (two) times a day with meals. 180 tablet 0     multivitamin with minerals (THERA-M) 9 mg iron-400 mcg Tab tablet Take 1 tablet by mouth daily.       OMEGA-3/DHA/EPA/FISH OIL (FISH OIL-OMEGA-3 FATTY ACIDS) 300-1,000 mg capsule Take 300 g by mouth daily.        ONETOUCH DELICA LANCETS 30 gauge Misc USE ONCE DAILY TO CHECK BLOOD   Dx. E11.9 100 each 11     psyllium with dextrose (METAMUCIL JAR) powder Take by mouth daily.       simvastatin (ZOCOR) 20 MG tablet Take 1 tablet (20 mg total) by mouth at bedtime. 90 tablet 1     No current facility-administered medications for this visit.

## 2021-06-21 NOTE — PROGRESS NOTES
ASSESSMENT and PLAN:  1. Essential hypertension  Adequately controlled with his current medication.  Home readings are reasonably controlled as well.  Medications can be refilled as needed.  I am checking lab work today to look at electrolytes and kidney function.  We will also look for any signs of for dysuria.  - Comprehensive Metabolic Panel  - Urinalysis Macro & Micro    2. Mixed hyperlipidemia  He is done well on simvastatin.  He is fasting today for lab work.  - Comprehensive Metabolic Panel  - Lipid Cascade    3. Type 2 diabetes mellitus (H)  He is very well controlled on metformin as well as diet.  He has had no hypoglycemic episodes.  Lab work today to look at renal function A1c urine for microalbuminemia.  - Comprehensive Metabolic Panel  - Glycosylated Hemoglobin A1c  - Microalbumin, Random Urine    4. Anemia  Stable and without symptoms.  We will continue to periodically check a CBC.  - HM2(CBC w/o Differential)  - HM1 (CBC and Differential)  - HM1 (CBC with Diff)    Medications Discontinued During This Encounter   Medication Reason     OMEGA-3/DHA/EPA/FISH OIL (FISH OIL-OMEGA-3 FATTY ACIDS) 300-1,000 mg capsule Therapy completed     FLUZONE HIGH-DOSE 2017-18, PF, 180 mcg/0.5 mL Syrg injection Therapy completed     FLUZONE HIGH-DOSE 2016-17, PF, 180 mcg/0.5 mL Syrg injection Therapy completed       Return in about 6 months (around 4/16/2019) for Diabetes, Blood pressure.    Patient Instructions   Today's labs will be available on [employer].  If you aren't signed up, then we'll mail them out.  If anything needs more immediate follow up, we'll also call.    Take care!      CHIEF COMPLAINT:  Chief Complaint   Patient presents with     Diabetes     Hypertension       HISTORY OF PRESENT ILLNESS:  Adrianna Pedersen is a 83 y.o. male  presenting to the clinic today for routine his chronic medical conditions.  He lives in detailed log of his race daily blood sugar checks.  He also intermittently checks his blood  "pressure.  In general, both have been under very good control.  He tends to have some higher blood sugars 2 hours after lunch but he attributes that to sleep at lunchtime.  He saw Dr. Ortiz for his eye exam 412 and was told that there were no changes.    He has a mild chronic anemia.  His hemoglobin runs between 12 and 13.  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  He does like to routinely see his CBC with differential.  He has no symptoms related to this.    Reviewed his medication.  For his diabetes, he takes metformin 850 mg twice daily.    For hypertension, he is on cardiology 5 mg twice daily, irbesartan 300 mg daily.    He is on simvastatin 20 mg daily and he takes a daily baby aspirin.    He would like his flu shot today.    REVIEW OF SYSTEMS:    All other systems are negative.    PFSH:  Family History   Problem Relation Age of Onset     Nephrolithiasis Daughter      Hyperlipidemia Son      Pneumonia Father      Parkinsonism Brother      Diabetes Brother      Kidney failure Brother      Diabetes Brother        TOBACCO USE:  History   Smoking Status     Never Smoker   Smokeless Tobacco     Never Used       VITALS:  Vitals:    10/16/18 1119   BP: 108/72   Patient Site: Right Arm   Patient Position: Sitting   Cuff Size: Adult Large   Pulse: (!) 56   Weight: 147 lb 3.2 oz (66.8 kg)   Height: 5' 5\" (1.651 m)     Wt Readings from Last 3 Encounters:   10/16/18 147 lb 3.2 oz (66.8 kg)   04/25/18 145 lb 7 oz (66 kg)   03/07/17 143 lb (64.9 kg)     PHYSICAL EXAM:  Constitutional:  Reveals an alert, pleasant adult male.   Vitals:  Noted.   Lungs: Clear to auscultation bilaterally, respirations unlabored.   Heart: Regular rate and rhythm, S1 and S2 normal, no murmur, rub, or gallop,   Abdomen: Soft, non-tender, bowel sounds active  Extremities: Extremities normal, atraumatic, no cyanosis or edema, no open lesions on his feet bilaterally, a few non-ulcerated callouses   Skin: Skin color, texture, " turgor normal, no rashes or lesions     MEDICATIONS:  Current Outpatient Prescriptions   Medication Sig Dispense Refill     aspirin 81 MG EC tablet Take 81 mg by mouth daily.       blood glucose control, normal Soln Use to test blood sugar 2x day. DX:E11.9 1 each 0     blood glucose test strips Use 1 each As Directed daily. Dispense brand per patient's insurance at pharmacy discretion.   Dx. E11.9 100 strip 11     blood-glucose meter Misc Use 1 Device As Directed 2 (two) times a day. 1 each 0     carvedilol (COREG) 25 MG tablet TAKE 1 TABLET (25 MG TOTAL) BY MOUTH 2 (TWO) TIMES A DAY WITH MEALS. 180 tablet 1     cetirizine (ZYRTEC) 10 MG tablet Take 10 mg by mouth daily.       cholecalciferol, vitamin D3, 400 unit Tab Take 2,000 Units by mouth once.       famotidine (FOR PEPCID) 10 MG tablet Take 10 mg by mouth as needed for heartburn.       FLOVENT  mcg/actuation inhaler INHALE 2 PUFFS 2 (TWO) TIMES A DAY. 1 Inhaler 0     fluticasone (FLOVENT HFA) 110 mcg/actuation inhaler Inhale 2 puffs 2 (two) times a day. 3 Inhaler 4     generic lancets Use lancet for once daily blood check related to DM E11.9.  Please dispense One Touch Ultra Soft brand. 100 each 3     irbesartan (AVAPRO) 300 MG tablet TAKE 1 TABLET (300 MG TOTAL) BY MOUTH DAILY. 90 tablet 1     metFORMIN (GLUCOPHAGE) 850 MG tablet TAKE 1 TABLET (850 MG TOTAL) BY MOUTH 2 (TWO) TIMES A DAY WITH MEALS. 180 tablet 1     multivitamin with minerals (THERA-M) 9 mg iron-400 mcg Tab tablet Take 1 tablet by mouth daily.       ONETOUCH DELICA LANCETS 30 gauge Misc USE ONCE DAILY TO CHECK BLOOD   Dx. E11.9 100 each 11     psyllium with dextrose (METAMUCIL JAR) powder Take by mouth daily.       simvastatin (ZOCOR) 20 MG tablet TAKE 1 TABLET (20 MG TOTAL) BY MOUTH AT BEDTIME. 90 tablet 1     No current facility-administered medications for this visit.

## 2021-06-23 NOTE — TELEPHONE ENCOUNTER
Refill Approved    Rx renewed per Medication Renewal Policy. Medication was last renewed on 8/1/18.    Geri Eli, [facility] Triage/Med Refill 2/7/2019     Requested Prescriptions   Pending Prescriptions Disp Refills     irbesartan (AVAPRO) 300 MG tablet [Pharmacy Med Name: IRBESARTAN 300 MG TABLET] 90 tablet 1     Sig: TAKE 1 TABLET (300 MG TOTAL) BY MOUTH DAILY.    Angiotensin Receptor Blocker Protocol Passed - 2/5/2019  8:47 AM       Passed - PCP or prescribing provider visit in past 12 months      Last office visit with prescriber/PCP: 10/16/2018 Lexy Sánchez MD OR same dept: 10/16/2018 Lexy Sánchez MD OR same specialty: 10/16/2018 Lexy Sánchez MD  Last physical: 11/8/2016 Last MTM visit: Visit date not found   Next visit within 3 mo: Visit date not found  Next physical within 3 mo: Visit date not found  Prescriber OR PCP: Lexy Sánchez MD  Last diagnosis associated with med order: 1. HTN (hypertension)  - irbesartan (AVAPRO) 300 MG tablet [Pharmacy Med Name: IRBESARTAN 300 MG TABLET]; TAKE 1 TABLET (300 MG TOTAL) BY MOUTH DAILY.  Dispense: 90 tablet; Refill: 1    If protocol passes may refill for 12 months if within 3 months of last provider visit (or a total of 15 months).            Passed - Serum potassium within the past 12 months    Lab Results   Component Value Date    Potassium 4.3 10/16/2018            Passed - Blood pressure filed in past 12 months    BP Readings from Last 1 Encounters:   10/16/18 108/72            Passed - Serum creatinine within the past 12 months    Creatinine   Date Value Ref Range Status   10/16/2018 0.89 0.70 - 1.30 mg/dL Final

## 2021-06-27 ENCOUNTER — HEALTH MAINTENANCE LETTER (OUTPATIENT)
Age: 86
End: 2021-06-27

## 2021-07-03 NOTE — ADDENDUM NOTE
Addendum Note by Lexy Sánchez at 3/28/2017 10:02 AM     Author: Lexy Sánchez Service: -- Author Type: Physician    Filed: 3/28/2017 10:02 AM Encounter Date: 3/27/2017 Status: Signed    : Lexy Sánchez    Addended by: LEXY SÁNCHEZ on: 3/28/2017 10:02 AM        Modules accepted: Orders

## 2021-07-03 NOTE — ADDENDUM NOTE
Addendum Note by Armin Rothman RN at 11/7/2017  3:30 PM     Author: Armin Rothman RN Service: -- Author Type: Registered Nurse    Filed: 11/7/2017  3:30 PM Encounter Date: 11/2/2017 Status: Signed    : Armin Rothman RN (Registered Nurse)    Addended by: ARMIN ROTHMAN on: 11/7/2017 03:30 PM        Modules accepted: Orders

## 2021-07-03 NOTE — ADDENDUM NOTE
Addendum Note by Pilar Coles CMA at 11/7/2017  3:23 PM     Author: Pilar Coles CMA Service: -- Author Type: Certified Medical Assistant    Filed: 11/7/2017  3:23 PM Encounter Date: 11/2/2017 Status: Signed    : Pilar Coles CMA (Certified Medical Assistant)    Addended by: PILAR COLES on: 11/7/2017 03:23 PM        Modules accepted: Orders

## 2021-10-17 ENCOUNTER — HEALTH MAINTENANCE LETTER (OUTPATIENT)
Age: 86
End: 2021-10-17

## 2022-02-06 ENCOUNTER — HEALTH MAINTENANCE LETTER (OUTPATIENT)
Age: 87
End: 2022-02-06

## 2022-07-23 ENCOUNTER — HEALTH MAINTENANCE LETTER (OUTPATIENT)
Age: 87
End: 2022-07-23

## 2022-10-01 ENCOUNTER — HEALTH MAINTENANCE LETTER (OUTPATIENT)
Age: 87
End: 2022-10-01

## 2023-05-14 ENCOUNTER — HEALTH MAINTENANCE LETTER (OUTPATIENT)
Age: 88
End: 2023-05-14

## 2023-08-12 ENCOUNTER — HEALTH MAINTENANCE LETTER (OUTPATIENT)
Age: 88
End: 2023-08-12

## 2023-12-30 ENCOUNTER — HEALTH MAINTENANCE LETTER (OUTPATIENT)
Age: 88
End: 2023-12-30